# Patient Record
Sex: FEMALE | Race: WHITE | Employment: UNEMPLOYED | URBAN - NONMETROPOLITAN AREA
[De-identification: names, ages, dates, MRNs, and addresses within clinical notes are randomized per-mention and may not be internally consistent; named-entity substitution may affect disease eponyms.]

---
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
## 2025-01-22 ENCOUNTER — OFFICE VISIT (OUTPATIENT)
Age: 20
End: 2025-01-22
Payer: MEDICAID

## 2025-01-22 VITALS
DIASTOLIC BLOOD PRESSURE: 72 MMHG | HEIGHT: 63 IN | OXYGEN SATURATION: 98 % | WEIGHT: 181 LBS | HEART RATE: 87 BPM | BODY MASS INDEX: 32.07 KG/M2 | SYSTOLIC BLOOD PRESSURE: 102 MMHG | TEMPERATURE: 98.4 F

## 2025-01-22 DIAGNOSIS — J35.8 TONSILLITH: ICD-10-CM

## 2025-01-22 DIAGNOSIS — J02.0 STREP THROAT: Primary | ICD-10-CM

## 2025-01-22 DIAGNOSIS — J02.9 SORE THROAT: ICD-10-CM

## 2025-01-22 LAB — S PYO AG THROAT QL: POSITIVE

## 2025-01-22 PROCEDURE — 99213 OFFICE O/P EST LOW 20 MIN: CPT | Performed by: NURSE PRACTITIONER

## 2025-01-22 PROCEDURE — 87880 STREP A ASSAY W/OPTIC: CPT | Performed by: NURSE PRACTITIONER

## 2025-01-22 RX ORDER — AMOXICILLIN 500 MG/1
500 CAPSULE ORAL 2 TIMES DAILY
Qty: 20 CAPSULE | Refills: 0 | Status: SHIPPED | OUTPATIENT
Start: 2025-01-22 | End: 2025-02-01

## 2025-01-22 SDOH — ECONOMIC STABILITY: FOOD INSECURITY: WITHIN THE PAST 12 MONTHS, THE FOOD YOU BOUGHT JUST DIDN'T LAST AND YOU DIDN'T HAVE MONEY TO GET MORE.: NEVER TRUE

## 2025-01-22 SDOH — ECONOMIC STABILITY: FOOD INSECURITY: WITHIN THE PAST 12 MONTHS, YOU WORRIED THAT YOUR FOOD WOULD RUN OUT BEFORE YOU GOT MONEY TO BUY MORE.: NEVER TRUE

## 2025-01-22 ASSESSMENT — ENCOUNTER SYMPTOMS
SORE THROAT: 1
TROUBLE SWALLOWING: 0
COUGH: 1
SINUS PRESSURE: 0
NAUSEA: 0
RHINORRHEA: 0
CONSTIPATION: 0
VOMITING: 0
SHORTNESS OF BREATH: 0
DIARRHEA: 0
ABDOMINAL PAIN: 0

## 2025-01-22 ASSESSMENT — PATIENT HEALTH QUESTIONNAIRE - PHQ9
SUM OF ALL RESPONSES TO PHQ QUESTIONS 1-9: 0
2. FEELING DOWN, DEPRESSED OR HOPELESS: NOT AT ALL
SUM OF ALL RESPONSES TO PHQ9 QUESTIONS 1 & 2: 0
1. LITTLE INTEREST OR PLEASURE IN DOING THINGS: NOT AT ALL
SUM OF ALL RESPONSES TO PHQ QUESTIONS 1-9: 0

## 2025-01-22 NOTE — PROGRESS NOTES
Kaylin Chatterjee (:  2005) is a 19 y.o. female, Established patient, here for evaluation of the following chief complaint(s):  Cough (Congestion, phlegm, no sore throat but neck is sore and tonsils are swollen. No fever. Fatigue, nausea. Started 2 days ago. )         Assessment & Plan  1. Strep throat.  The rapid strep test was positive. She is advised to perform warm salt water rinses and maintain good oral hygiene to prevent tonsil stones. After 2 to 3 days of antibiotic therapy, she should replace her toothbrush. She is also advised to avoid sharing drinks and kissing to prevent spreading the infection. A prescription for antibiotics will be sent to [facility] pharmacy. If symptoms worsen, she should notify the clinic.    2. Tonsil stones.  She has a history of recurrent tonsil stones. She is advised to use a water pick or a Q-tip to gently dislodge the stones. Regular gargling and thorough rinsing are recommended to prevent recurrence.    3. Rib pain.  She reports rib pain associated with her current illness. She is advised to take ibuprofen for pain relief. If the pain persists or worsens, further evaluation may be necessary.    4. Irregular menstrual cycles.  She reports irregular menstrual cycles since resuming birth control in October. Further evaluation and management will be discussed in a follow-up visit.        Results  Laboratory Studies  Strep throat test is positive.  1. Strep throat  -     amoxicillin (AMOXIL) 500 MG capsule; Take 1 capsule by mouth 2 times daily for 10 days, Disp-20 capsule, R-0Normal  2. Sore throat  -     POCT rapid strep A  3. Tonsillith    Return if symptoms worsen or fail to improve.       Subjective   History of Present Illness  The patient is a 19-year-old female who presents for evaluation of cough, congestion, and sore throat.    She reports experiencing a sore throat, cough, and congestion that began a few days ago. She is uncertain about the presence of fever. She has

## 2025-01-30 ENCOUNTER — OFFICE VISIT (OUTPATIENT)
Age: 20
End: 2025-01-30
Payer: MEDICAID

## 2025-01-30 VITALS
WEIGHT: 180 LBS | BODY MASS INDEX: 31.89 KG/M2 | HEIGHT: 63 IN | TEMPERATURE: 98.1 F | OXYGEN SATURATION: 97 % | DIASTOLIC BLOOD PRESSURE: 76 MMHG | SYSTOLIC BLOOD PRESSURE: 142 MMHG | HEART RATE: 96 BPM

## 2025-01-30 DIAGNOSIS — J03.80 ACUTE BACTERIAL TONSILLITIS: ICD-10-CM

## 2025-01-30 DIAGNOSIS — R11.2 NAUSEA AND VOMITING, UNSPECIFIED VOMITING TYPE: ICD-10-CM

## 2025-01-30 DIAGNOSIS — B96.89 ACUTE BACTERIAL TONSILLITIS: ICD-10-CM

## 2025-01-30 DIAGNOSIS — R39.89 GENITAL SORE: ICD-10-CM

## 2025-01-30 DIAGNOSIS — R50.9 FEVER, UNSPECIFIED FEVER CAUSE: ICD-10-CM

## 2025-01-30 DIAGNOSIS — J03.80 ACUTE BACTERIAL TONSILLITIS: Primary | ICD-10-CM

## 2025-01-30 DIAGNOSIS — B96.89 ACUTE BACTERIAL TONSILLITIS: Primary | ICD-10-CM

## 2025-01-30 LAB
EBV VCA AB SER QL: POSITIVE
INFLUENZA A ANTIBODY: NEGATIVE
INFLUENZA B ANTIBODY: NEGATIVE

## 2025-01-30 PROCEDURE — 87804 INFLUENZA ASSAY W/OPTIC: CPT | Performed by: NURSE PRACTITIONER

## 2025-01-30 PROCEDURE — 99214 OFFICE O/P EST MOD 30 MIN: CPT | Performed by: NURSE PRACTITIONER

## 2025-01-30 ASSESSMENT — ENCOUNTER SYMPTOMS
COUGH: 0
SORE THROAT: 1
BACK PAIN: 0
DIARRHEA: 0
WHEEZING: 0
VOMITING: 0
NAUSEA: 0
ABDOMINAL PAIN: 0
SHORTNESS OF BREATH: 0

## 2025-01-30 NOTE — PROGRESS NOTES
Kaylin Chatterjee (:  2005) is a 19 y.o. female,Established patient, here for evaluation of the following chief complaint(s):  Sore Throat (Patient presents today for strep throat. She was dx 1 week ago. No better. Patient reports new cough, swollen tonsils, pleurisy, vomiting. )         Assessment & Plan  Fever, unspecified fever cause    Tylenol and motrin for fever or pain    Orders:    POCT Influenza A/B    amoxicillin-clavulanate (AUGMENTIN) 875-125 MG per tablet; Take 1 tablet by mouth 2 times daily for 10 days    Mononucleosis Screen; Future    Nausea and vomiting, unspecified vomiting type    [place]ade diet advance as tolerated     Orders:    POCT Influenza A/B    omeprazole (PRILOSEC) 20 MG delayed release capsule; Take 1 capsule by mouth every morning (before breakfast)    Genital sore    Will monitor for outbreak recheck labs for IGM  and close monitor    Orders:    HSV 1/2 Ab, IgG, IgM Reflex, CSF; Future    Acute bacterial tonsillitis    Salt water gargles    Orders:    amoxicillin-clavulanate (AUGMENTIN) 875-125 MG per tablet; Take 1 tablet by mouth 2 times daily for 10 days    Mononucleosis Screen; Future      No follow-ups on file.       Subjective   HPI      Patient is here with strep throat  Reports was swollen and felt uvula swollen   Notes that was started on amoxil  ( but denies symptoms)    Reports had ulcers on vaginal area  And swabbed her vagina kit but didn't check the wounds  And checked her blood work   She had went to GYN for the testing   Reports the areas was painful and had to dab areas  She is sexually denies using protection     Review of Systems   Constitutional:  Positive for activity change and appetite change. Negative for fever.   HENT:  Positive for congestion and sore throat.    Respiratory:  Negative for cough, shortness of breath and wheezing.    Cardiovascular:  Negative for chest pain, palpitations and leg swelling.   Gastrointestinal:  Negative for abdominal pain,

## 2025-01-31 ENCOUNTER — TELEPHONE (OUTPATIENT)
Age: 20
End: 2025-01-31

## 2025-01-31 NOTE — TELEPHONE ENCOUNTER
Called patient, spoke with: Patient regarding the results of the patients most recent labs.  I advised Patient of Karen Florian recommendations.   Patient did voice understanding

## 2025-01-31 NOTE — TELEPHONE ENCOUNTER
----- Message from GINO Matta sent at 1/30/2025  8:09 PM CST -----  Mono positive .. supportive care avoid any sports or rough housing till abdominal pain resolves along with symptoms

## 2025-02-01 LAB
HSV1 GG IGG SER-ACNC: <0.01 IV
HSV2 GG IGG SER-ACNC: 0.23 IV

## 2025-02-03 ENCOUNTER — TELEPHONE (OUTPATIENT)
Age: 20
End: 2025-02-03

## 2025-02-03 NOTE — TELEPHONE ENCOUNTER
----- Message from GINO Matta sent at 2/2/2025 10:18 AM CST -----  Hsv 1 and Hsv 2 both negative still recheck in 1 month

## 2025-02-03 NOTE — TELEPHONE ENCOUNTER
Patient called to report symptoms are not improving  She thought she would be feeling better by now  Please advise

## 2025-02-04 ENCOUNTER — TELEPHONE (OUTPATIENT)
Age: 20
End: 2025-02-04

## 2025-02-04 NOTE — TELEPHONE ENCOUNTER
LVM instructing patient to call back  to let us know what days she missed of work for FMLA paperwork.

## 2025-02-20 ENCOUNTER — OFFICE VISIT (OUTPATIENT)
Age: 20
End: 2025-02-20
Payer: MEDICAID

## 2025-02-20 VITALS
SYSTOLIC BLOOD PRESSURE: 100 MMHG | HEART RATE: 72 BPM | OXYGEN SATURATION: 98 % | TEMPERATURE: 98.4 F | HEIGHT: 63 IN | WEIGHT: 180.5 LBS | BODY MASS INDEX: 31.98 KG/M2 | DIASTOLIC BLOOD PRESSURE: 60 MMHG

## 2025-02-20 DIAGNOSIS — J03.91 RECURRENT TONSILLITIS: Primary | ICD-10-CM

## 2025-02-20 DIAGNOSIS — K21.9 GASTROESOPHAGEAL REFLUX DISEASE WITHOUT ESOPHAGITIS: ICD-10-CM

## 2025-02-20 DIAGNOSIS — N89.8 VAGINAL SORE: ICD-10-CM

## 2025-02-20 PROCEDURE — 99214 OFFICE O/P EST MOD 30 MIN: CPT | Performed by: NURSE PRACTITIONER

## 2025-02-20 ASSESSMENT — ENCOUNTER SYMPTOMS: RESPIRATORY NEGATIVE: 1

## 2025-02-20 NOTE — PROGRESS NOTES
Kaylin Chatterjee (:  2005) is a 19 y.o. female, Established patient, here for evaluation of the following chief complaint(s):  Follow-up (Patient is following up today after two weeks for tonsillitis and HSV testing. )         Assessment & Plan  1. Recurrent tonsillitis: Resolved. Mono test positive on 2025.  - Completed Augmentin course.  - Continue salt water gargles as needed.    2. Genital lesion: Suspected secondary to mononucleosis.  - HSV 1 and 2 tests negative.  - Recheck HSV antibodies today.  - If an outbreak occurs, culture the affected area.    3. Gastroesophageal reflux disease: Resolved.  - Discontinued omeprazole.    Follow-up  - Yearly checkup when turning 20.    Results  Laboratory Studies  Mono test came back positive. HSV 1 and 2 were both negative.  No results found for this visit on 25.    ICD-10-CM    1. Recurrent tonsillitis  J03.91 Resolved no new complaints       2. Vaginal sore  N89.8 HSV 1, 2 Glyco G-Specific IgG  Will check discussed protective intercourse return if symptoms      3. Gastroesophageal reflux disease without esophagitis  K21.9 Resolved          Return in about 6 months (around 2025) for yearly check up.       Subjective   History of Present Illness  The patient is a 19-year-old female here for a 2-week follow-up from recurrent tonsillitis and HSV testing.    Tonsillitis  - Reports significant improvement in her throat condition  - No observable swelling  - Does not experience any current sore throat symptoms  - Has been performing salt water gargles, which she believes have contributed to her recovery    HSV  - Has not experienced any further breakouts since her last visit  - Reports no presence of genital sores    Reflux  - Has discontinued the use of omeprazole  - Previous symptoms of reflux, nausea, and vomiting have resolved a few days post-medication    MEDICATIONS  Discontinued: Omeprazole.    Prior to Visit Medications    Medication Sig

## 2025-02-22 LAB
HSV1 GG IGG SER-ACNC: 0.11 IV
HSV2 GG IGG SER-ACNC: 0.09 IV

## 2025-02-24 ENCOUNTER — TELEPHONE (OUTPATIENT)
Age: 20
End: 2025-02-24

## 2025-02-26 DIAGNOSIS — J30.1 SEASONAL ALLERGIC RHINITIS DUE TO POLLEN: ICD-10-CM

## 2025-02-26 RX ORDER — LEVOCETIRIZINE DIHYDROCHLORIDE 5 MG/1
5 TABLET, FILM COATED ORAL NIGHTLY
Qty: 30 TABLET | Refills: 3 | Status: SHIPPED | OUTPATIENT
Start: 2025-02-26

## 2025-02-26 NOTE — TELEPHONE ENCOUNTER
Received fax from pharmacy requesting refill on pts medication(s). Pt was last seen in office on 2/20/25  and has a follow up scheduled for Visit date not found. Will send request to  Karen Florian  for patient.     Requested Prescriptions     Pending Prescriptions Disp Refills    levocetirizine (XYZAL) 5 MG tablet [Pharmacy Med Name: LEVOCETIRIZINE 5 MG TABLET] 30 tablet 3     Sig: TAKE 1 TABLET BY MOUTH EVERY DAY AT NIGHT

## 2025-03-05 DIAGNOSIS — Z30.09 ENCOUNTER FOR OTHER GENERAL COUNSELING AND ADVICE ON CONTRACEPTION: ICD-10-CM

## 2025-03-05 RX ORDER — DROSPIRENONE AND ESTETROL 3-14.2(28)
1 KIT ORAL DAILY
Qty: 84 TABLET | Refills: 3 | OUTPATIENT
Start: 2025-03-05

## 2025-03-31 ENCOUNTER — OFFICE VISIT (OUTPATIENT)
Age: 20
End: 2025-03-31
Payer: MEDICAID

## 2025-03-31 VITALS
WEIGHT: 174 LBS | HEART RATE: 86 BPM | TEMPERATURE: 98.4 F | OXYGEN SATURATION: 98 % | BODY MASS INDEX: 30.83 KG/M2 | HEIGHT: 63 IN | SYSTOLIC BLOOD PRESSURE: 105 MMHG | DIASTOLIC BLOOD PRESSURE: 70 MMHG

## 2025-03-31 DIAGNOSIS — E28.2 PCOS (POLYCYSTIC OVARIAN SYNDROME): Primary | ICD-10-CM

## 2025-03-31 DIAGNOSIS — N92.6 MISSED PERIOD: ICD-10-CM

## 2025-03-31 DIAGNOSIS — E28.2 PCOS (POLYCYSTIC OVARIAN SYNDROME): ICD-10-CM

## 2025-03-31 LAB
ESTRADIOL SERPL-SCNC: 30.9 PG/ML
FSH SERPL-SCNC: 3.8 MIU/ML
GONADOTROPIN, CHORIONIC (HCG) QUANT: 0.2 MIU/ML (ref 0–5.3)
LH SERPL-ACNC: 3 MIU/ML
PROGEST SERPL-MCNC: 0.79 NG/ML
TESTOST SERPL-MCNC: 70.6 NG/DL (ref 15.3–31.1)

## 2025-03-31 PROCEDURE — 99214 OFFICE O/P EST MOD 30 MIN: CPT | Performed by: NURSE PRACTITIONER

## 2025-03-31 ASSESSMENT — ENCOUNTER SYMPTOMS
WHEEZING: 0
TROUBLE SWALLOWING: 0
CONSTIPATION: 0
DIARRHEA: 0
BLOOD IN STOOL: 0
SORE THROAT: 0
EYE REDNESS: 0
ABDOMINAL PAIN: 0
COUGH: 0
EYE DISCHARGE: 0
RHINORRHEA: 0

## 2025-03-31 NOTE — ASSESSMENT & PLAN NOTE
Orders:    HCG, Quantitative, Pregnancy; Future    Follicle Stimulating Hormone; Future    Luteinizing Hormone; Future    DHEA-Sulfate; Future    Estradiol; Future    Progesterone; Future    Testosterone; Future

## 2025-03-31 NOTE — PROGRESS NOTES
will be utilized during this visit to record, process the conversation to generate a clinical note and to support improvement of the AI technology. The patient (or guardian, if applicable) and other individuals in attendance at the appointment consented to the use of AI, including the recording.      An electronic signature was used to authenticate this note.    --GINO Simpson

## 2025-04-01 ENCOUNTER — RESULTS FOLLOW-UP (OUTPATIENT)
Age: 20
End: 2025-04-01

## 2025-04-01 DIAGNOSIS — R79.89 HIGH SERUM TESTOSTERONE: Primary | ICD-10-CM

## 2025-04-01 RX ORDER — SPIRONOLACTONE 50 MG/1
50 TABLET, FILM COATED ORAL DAILY
Qty: 30 TABLET | Refills: 3 | Status: SHIPPED | OUTPATIENT
Start: 2025-04-01

## 2025-04-02 LAB — DHEA-S SERPL-MCNC: 794 UG/DL (ref 63–373)

## 2025-06-10 ENCOUNTER — OFFICE VISIT (OUTPATIENT)
Age: 20
End: 2025-06-10

## 2025-06-10 VITALS
OXYGEN SATURATION: 98 % | DIASTOLIC BLOOD PRESSURE: 82 MMHG | SYSTOLIC BLOOD PRESSURE: 120 MMHG | HEART RATE: 80 BPM | TEMPERATURE: 98.8 F | WEIGHT: 171 LBS | BODY MASS INDEX: 30.39 KG/M2

## 2025-06-10 DIAGNOSIS — J02.0 STREPTOCOCCAL PHARYNGITIS: Primary | ICD-10-CM

## 2025-06-10 DIAGNOSIS — J02.9 SORE THROAT: ICD-10-CM

## 2025-06-10 LAB — S PYO AG THROAT QL: POSITIVE

## 2025-06-10 RX ORDER — CEFDINIR 300 MG/1
300 CAPSULE ORAL 2 TIMES DAILY
Qty: 20 CAPSULE | Refills: 0 | Status: SHIPPED | OUTPATIENT
Start: 2025-06-10 | End: 2025-06-20

## 2025-06-10 ASSESSMENT — ENCOUNTER SYMPTOMS
BLOOD IN STOOL: 0
NAUSEA: 0
DIARRHEA: 0
EYE ITCHING: 0
ABDOMINAL PAIN: 0
COUGH: 0
WHEEZING: 0
SINUS PRESSURE: 0
COLOR CHANGE: 0
CONSTIPATION: 0
SORE THROAT: 1
EYE DISCHARGE: 0
RHINORRHEA: 0
SHORTNESS OF BREATH: 0
VOMITING: 0

## 2025-06-10 NOTE — PROGRESS NOTES
rapid strep A     Results for orders placed or performed in visit on 06/10/25   POCT rapid strep A   Result Value Ref Range    Strep A Ag Positive (A) None Detected       Orders Placed This Encounter   Medications    cefdinir (OMNICEF) 300 MG capsule     Sig: Take 1 capsule by mouth 2 times daily for 10 days     Dispense:  20 capsule     Refill:  0      New Prescriptions    CEFDINIR (OMNICEF) 300 MG CAPSULE    Take 1 capsule by mouth 2 times daily for 10 days        Return if symptoms worsen or fail to improve.         Discussed usage, benefits, and side effects of any administered or prescribed medications. All questions were answered regarding evaluation, diagnosis, and treatment plan done during today's visit. Patient was strongly encouraged to follow up with PCP within the next few days to be re-evaluated for improvement in symptoms or for any other questions. Return precautions for worsening of the condition or development of new concerning symptoms discussed. Patient and/or guardian was given educational information, verbalized understanding, and is in agreement with treatment plan.   Patient was given educational materials - see patient instructions below.     Patient Instructions   -Take full course of antibiotics  -Monitor for fever and treat as needed with tylenol or ibuprofen  -Recommended throat lozenges as needed and salt water gargles three times daily  -Replace toothbrush in 24-48 hours after antibiotics are started  -The patient is to follow up with PCP or return to clinic if symptoms worsen/fail to improve.         Electronically signed by GINO Duvall CNP on 6/10/2025 at 3:55 PM

## 2025-08-25 ENCOUNTER — OFFICE VISIT (OUTPATIENT)
Age: 20
End: 2025-08-25
Payer: MEDICAID

## 2025-08-25 VITALS
HEART RATE: 82 BPM | HEIGHT: 63 IN | TEMPERATURE: 98.4 F | BODY MASS INDEX: 29.99 KG/M2 | SYSTOLIC BLOOD PRESSURE: 98 MMHG | WEIGHT: 169.25 LBS | OXYGEN SATURATION: 99 % | DIASTOLIC BLOOD PRESSURE: 68 MMHG

## 2025-08-25 DIAGNOSIS — B37.9 YEAST INFECTION: Primary | ICD-10-CM

## 2025-08-25 DIAGNOSIS — Z20.2 EXPOSURE TO STD: ICD-10-CM

## 2025-08-25 PROCEDURE — 99214 OFFICE O/P EST MOD 30 MIN: CPT | Performed by: NURSE PRACTITIONER

## 2025-08-25 RX ORDER — FLUCONAZOLE 150 MG/1
150 TABLET ORAL DAILY
Qty: 3 TABLET | Refills: 0 | Status: SHIPPED | OUTPATIENT
Start: 2025-08-25 | End: 2025-08-28

## 2025-08-25 ASSESSMENT — ENCOUNTER SYMPTOMS
COUGH: 0
WHEEZING: 0
SHORTNESS OF BREATH: 0

## 2025-08-28 ENCOUNTER — RESULTS FOLLOW-UP (OUTPATIENT)
Age: 20
End: 2025-08-28

## 2025-08-28 LAB
HSV1 GG IGG SER-ACNC: <0.01 IV
HSV2 GG IGG SER-ACNC: 0.04 IV

## 2025-08-28 RX ORDER — METRONIDAZOLE 500 MG/1
500 TABLET ORAL 2 TIMES DAILY
Qty: 14 TABLET | Refills: 0 | Status: SHIPPED | OUTPATIENT
Start: 2025-08-28 | End: 2025-09-04

## 2025-08-28 RX ORDER — DOXYCYCLINE HYCLATE 100 MG
100 TABLET ORAL 2 TIMES DAILY
Qty: 14 TABLET | Refills: 0 | Status: SHIPPED | OUTPATIENT
Start: 2025-08-28 | End: 2025-09-04

## 2025-08-28 RX ORDER — FLUCONAZOLE 150 MG/1
150 TABLET ORAL DAILY
Qty: 3 TABLET | Refills: 0 | Status: SHIPPED | OUTPATIENT
Start: 2025-08-28 | End: 2025-08-31